# Patient Record
Sex: FEMALE | Race: WHITE | NOT HISPANIC OR LATINO | Employment: OTHER | URBAN - METROPOLITAN AREA
[De-identification: names, ages, dates, MRNs, and addresses within clinical notes are randomized per-mention and may not be internally consistent; named-entity substitution may affect disease eponyms.]

---

## 2019-02-18 ENCOUNTER — APPOINTMENT (OUTPATIENT)
Dept: GENERAL RADIOLOGY | Facility: CLINIC | Age: 69
End: 2019-02-18
Attending: PHYSICIAN ASSISTANT
Payer: COMMERCIAL

## 2019-02-18 ENCOUNTER — OFFICE VISIT (OUTPATIENT)
Dept: URGENT CARE | Facility: URGENT CARE | Age: 69
End: 2019-02-18
Payer: COMMERCIAL

## 2019-02-18 ENCOUNTER — HOSPITAL ENCOUNTER (EMERGENCY)
Facility: CLINIC | Age: 69
Discharge: HOME | End: 2019-02-18
Attending: PHYSICIAN ASSISTANT | Admitting: PHYSICIAN ASSISTANT
Payer: COMMERCIAL

## 2019-02-18 ENCOUNTER — APPOINTMENT (OUTPATIENT)
Dept: ULTRASOUND IMAGING | Facility: CLINIC | Age: 69
End: 2019-02-18
Attending: PHYSICIAN ASSISTANT
Payer: COMMERCIAL

## 2019-02-18 VITALS
OXYGEN SATURATION: 100 % | SYSTOLIC BLOOD PRESSURE: 138 MMHG | DIASTOLIC BLOOD PRESSURE: 73 MMHG | BODY MASS INDEX: 20.12 KG/M2 | HEART RATE: 56 BPM | WEIGHT: 110 LBS | RESPIRATION RATE: 16 BRPM | TEMPERATURE: 97.1 F

## 2019-02-18 VITALS
HEART RATE: 67 BPM | OXYGEN SATURATION: 94 % | RESPIRATION RATE: 20 BRPM | WEIGHT: 110 LBS | SYSTOLIC BLOOD PRESSURE: 98 MMHG | TEMPERATURE: 97.3 F | BODY MASS INDEX: 20.12 KG/M2 | DIASTOLIC BLOOD PRESSURE: 56 MMHG

## 2019-02-18 DIAGNOSIS — R10.11 RUQ ABDOMINAL PAIN: Primary | ICD-10-CM

## 2019-02-18 DIAGNOSIS — R10.811 RIGHT UPPER QUADRANT ABDOMINAL TENDERNESS WITHOUT REBOUND TENDERNESS: ICD-10-CM

## 2019-02-18 LAB
ALBUMIN SERPL-MCNC: 4.1 G/DL (ref 3.4–5)
ALP SERPL-CCNC: 57 U/L (ref 40–150)
ALT SERPL W P-5'-P-CCNC: 22 U/L (ref 0–50)
ANION GAP SERPL CALCULATED.3IONS-SCNC: 4 MMOL/L (ref 3–14)
AST SERPL W P-5'-P-CCNC: 16 U/L (ref 0–45)
BASOPHILS # BLD AUTO: 0.1 10E9/L (ref 0–0.2)
BASOPHILS NFR BLD AUTO: 0.5 %
BILIRUB SERPL-MCNC: 0.4 MG/DL (ref 0.2–1.3)
BUN SERPL-MCNC: 28 MG/DL (ref 7–30)
CALCIUM SERPL-MCNC: 8.9 MG/DL (ref 8.5–10.1)
CHLORIDE SERPL-SCNC: 107 MMOL/L (ref 94–109)
CO2 SERPL-SCNC: 28 MMOL/L (ref 20–32)
CREAT SERPL-MCNC: 0.89 MG/DL (ref 0.52–1.04)
D DIMER PPP FEU-MCNC: 0.3 UG/ML FEU (ref 0–0.5)
DIFFERENTIAL METHOD BLD: NORMAL
EOSINOPHIL # BLD AUTO: 0.2 10E9/L (ref 0–0.7)
EOSINOPHIL NFR BLD AUTO: 1.4 %
ERYTHROCYTE [DISTWIDTH] IN BLOOD BY AUTOMATED COUNT: 13 % (ref 10–15)
GFR SERPL CREATININE-BSD FRML MDRD: 65 ML/MIN/{1.73_M2}
GLUCOSE SERPL-MCNC: 84 MG/DL (ref 70–99)
HCT VFR BLD AUTO: 42.1 % (ref 35–47)
HGB BLD-MCNC: 13.7 G/DL (ref 11.7–15.7)
IMM GRANULOCYTES # BLD: 0 10E9/L (ref 0–0.4)
IMM GRANULOCYTES NFR BLD: 0.3 %
LIPASE SERPL-CCNC: 112 U/L (ref 73–393)
LYMPHOCYTES # BLD AUTO: 2 10E9/L (ref 0.8–5.3)
LYMPHOCYTES NFR BLD AUTO: 18.7 %
MCH RBC QN AUTO: 32.5 PG (ref 26.5–33)
MCHC RBC AUTO-ENTMCNC: 32.5 G/DL (ref 31.5–36.5)
MCV RBC AUTO: 100 FL (ref 78–100)
MONOCYTES # BLD AUTO: 0.6 10E9/L (ref 0–1.3)
MONOCYTES NFR BLD AUTO: 5.3 %
NEUTROPHILS # BLD AUTO: 7.9 10E9/L (ref 1.6–8.3)
NEUTROPHILS NFR BLD AUTO: 73.8 %
NRBC # BLD AUTO: 0 10*3/UL
NRBC BLD AUTO-RTO: 0 /100
PLATELET # BLD AUTO: 321 10E9/L (ref 150–450)
POTASSIUM SERPL-SCNC: 3.3 MMOL/L (ref 3.4–5.3)
PROT SERPL-MCNC: 7.7 G/DL (ref 6.8–8.8)
RBC # BLD AUTO: 4.21 10E12/L (ref 3.8–5.2)
SODIUM SERPL-SCNC: 139 MMOL/L (ref 133–144)
TROPONIN I SERPL-MCNC: <0.015 UG/L (ref 0–0.04)
WBC # BLD AUTO: 10.7 10E9/L (ref 4–11)

## 2019-02-18 PROCEDURE — 76705 ECHO EXAM OF ABDOMEN: CPT

## 2019-02-18 PROCEDURE — 85025 COMPLETE CBC W/AUTO DIFF WBC: CPT | Performed by: PHYSICIAN ASSISTANT

## 2019-02-18 PROCEDURE — 80053 COMPREHEN METABOLIC PANEL: CPT | Performed by: PHYSICIAN ASSISTANT

## 2019-02-18 PROCEDURE — 93005 ELECTROCARDIOGRAM TRACING: CPT

## 2019-02-18 PROCEDURE — 96374 THER/PROPH/DIAG INJ IV PUSH: CPT

## 2019-02-18 PROCEDURE — 85379 FIBRIN DEGRADATION QUANT: CPT | Performed by: PHYSICIAN ASSISTANT

## 2019-02-18 PROCEDURE — 36415 COLL VENOUS BLD VENIPUNCTURE: CPT | Performed by: PHYSICIAN ASSISTANT

## 2019-02-18 PROCEDURE — 99215 OFFICE O/P EST HI 40 MIN: CPT | Performed by: PHYSICIAN ASSISTANT

## 2019-02-18 PROCEDURE — 83690 ASSAY OF LIPASE: CPT | Performed by: PHYSICIAN ASSISTANT

## 2019-02-18 PROCEDURE — 84484 ASSAY OF TROPONIN QUANT: CPT | Performed by: PHYSICIAN ASSISTANT

## 2019-02-18 PROCEDURE — 25000128 H RX IP 250 OP 636: Performed by: PHYSICIAN ASSISTANT

## 2019-02-18 PROCEDURE — 71101 X-RAY EXAM UNILAT RIBS/CHEST: CPT | Mod: RT

## 2019-02-18 PROCEDURE — 99285 EMERGENCY DEPT VISIT HI MDM: CPT | Mod: 25

## 2019-02-18 RX ORDER — KETOROLAC TROMETHAMINE 15 MG/ML
15 INJECTION, SOLUTION INTRAMUSCULAR; INTRAVENOUS ONCE
Status: COMPLETED | OUTPATIENT
Start: 2019-02-18 | End: 2019-02-18

## 2019-02-18 RX ADMIN — KETOROLAC TROMETHAMINE 15 MG: 15 INJECTION, SOLUTION INTRAMUSCULAR; INTRAVENOUS at 14:47

## 2019-02-18 ASSESSMENT — ENCOUNTER SYMPTOMS
DIARRHEA: 0
FEVER: 0
DIAPHORESIS: 1
HEADACHES: 0
WEAKNESS: 0
SHORTNESS OF BREATH: 0
LIGHT-HEADEDNESS: 0
VOMITING: 0
NAUSEA: 0
COUGH: 0
BACK PAIN: 1
NUMBNESS: 0

## 2019-02-18 NOTE — ED PROVIDER NOTES
History     Chief Complaint:  Chest Pain    The history is provided by the patient.      Nohemi Sanchez is a 68 year old female, current daily smoker, with history of hyperlipidemia and anxiety who presents with chest pain. The patient states that about 3 hours ago, she was sitting when she had a sudden onset of sharp pains below her ribcage, worse on the right side, with radiation around her back. She states that the pain exacerbates with inspiration and that she can only takes shallow breaths. The patient states that she has felt sweaty and warm as well. The patient reports that this pain feels similar to her history of pleurisy. The patient presented first to urgent care in [place], and they sent her here to the ED. She denies nausea, emesis, diarrhea, or recorded fevers. She denies a personal or family history of blood clots, recent travel or surgery, or use of hormones. She states that she took 2 Excedrin 7 hours ago.     Allergies:  No known drug allergies      Medications:    Aspirin  Escitalopram oxalate   Simvastatin   Ativan  Ambien    Past Medical History:    Anxiety  Depressive disorder   Hyperlipidemia    Past Surgical History:    C section    Family History:    History reviewed. No pertinent family history.      Social History:  PCP: [facility]   Marital Status:    Smoking status: current every day smoker  Alcohol use: No      Review of Systems   Constitutional: Positive for diaphoresis. Negative for fever.   Respiratory: Negative for cough and shortness of breath.    Cardiovascular: Positive for chest pain.   Gastrointestinal: Negative for diarrhea, nausea and vomiting.   Musculoskeletal: Positive for back pain.   Neurological: Negative for weakness, light-headedness, numbness and headaches.   All other systems reviewed and are negative.      Physical Exam     Patient Vitals for the past 24 hrs:   BP Temp Temp src Pulse Resp SpO2 Weight   02/18/19 1515 125/62 -- -- 52 -- -- --    02/18/19 1445 117/64 -- -- 56 -- 99 % --   02/18/19 1430 114/56 -- -- 55 -- 100 % --   02/18/19 1415 116/62 -- -- 58 -- 100 % --   02/18/19 1400 109/58 -- -- 61 -- 100 % --   02/18/19 1320 127/59 97.1  F (36.2  C) Oral 70 20 98 % 49.9 kg (110 lb)        Physical Exam  General: Alert, interactive. GCS 15  Head:  Scalp is atraumatic.  Eyes:  EOM intact. The pupils are equal, round, and reactive to light. No scleral icterus.  ENT:                                      Ears:  The external ears are normal.  Nose:  The external nose is normal.  Throat:  The oropharynx is normal. Mucus membranes are moist.                 Neck:  Normal range of motion. There is no rigidity.   CV:  Regular rate and rhythm. No murmur. 2+ radial pulses.  No reproducible chest wall tenderness.  Resp:  Breath sounds are clear bilaterally. Non-labored, no retractions or accessory muscle use.  GI:  Abdomen is soft, no distension, RUQ tenderness to palpation. Remainder of abdomen non-tender.   MS:  Normal range of motion.   Skin:  Warm and dry.   Neuro:  Strength and sensation grossly intact.   Psych:  Awake. Alert.  Appropriate interactions.     Emergency Department Course   ECG (13:26:58):  Rate 57 bpm. MT interval 138. QRS duration 84. QT/QTc 444/432. P-R-T axes 45 51 50.   Sinus bradycardia  Otherwise normal ECG  Interpreted at 1502 by Theo Cortez MD and Marge Gates PA-C.     Imaging:  Radiographic findings were communicated with the patient who voiced understanding of the findings.    Ribs XR, unilat 3 views + PA chest, right  Impression: No acute abnormality.  As read by Radiology.     Abdomen US, limited (RUQ only)  Impression:   1.  Dilated common bile duct, 1.1 cm, nonurgent MRCP could be obtained  for further evaluation.  2.  Prominent gallbladder without wall thickening, pericholecystic  abnormality or intraluminal sludge/stones. Findings are nonspecific.  If high clinical concern for acute cholecystitis a HIDA scan could  be  obtained.  As read by Radiology.     Laboratory:  Lipase: 112    CBC: WNL (WBC 10.7, HGB 13.7, )     CMP: potassium 3.3, creatinine 0.89    Troponin I: <0.015    D dimer: 0.3    Interventions:  1447: Toradol 15 mg, IV      Emergency Department Course:  Past medical records, nursing notes, and vitals reviewed.  1413: I performed an exam of the patient and obtained history, as documented above.    IV inserted and blood drawn.   The patient was sent for a Xray and US while in the emergency department, findings above.     1550: I rechecked the patient. Explained findings to the patient.    I rechecked the patient. Findings and plan explained to the Patient. Patient discharged home with instructions regarding supportive care, medications, and reasons to return. The importance of close follow-up was reviewed.       Impression & Plan      Medical Decision Making:  Nohemi Sanchez is a 68 year old female presents emergency department with chief complaint of chest pain, she describes right lower quadrant intermittent sharp chest pain, suspicious for pleurisy.  On exam, she has more right upper quadrant tenderness raising concern for hepatobiliary etiology.  Vitals normal on arrival.  Given the pleuritic chest pain component, d-dimer ordered.  She falls into the low risk category and with a negative d-dimer and no indication for CT chest.  EKG shows not sinus bradycardia without any acute ischemic changes.  Troponin negative.  Doubt acute coronary syndrome.  Right upper quadrant ultrasound revealed a dilated common bile duct of 1.1 cm and a prominent gallbladder without evidence of wall thickening or sludge/stones.  Without leukocytosis, fever, and patient is in no acute distress, I feel the patient can follow-up closely as an outpatient.  I am not convinced patient from her gallbladder after the Toradol she had complete resolution of her symptoms and certainly a possibility is pleuisy.  I emphasized the  importance of following up with her primary care provider in 1-2 days for recheck or return to the emergency department at any time for worsening pain, fevers, or any other new/concerning symptoms.    Diagnosis:    ICD-10-CM    1. Right upper quadrant abdominal tenderness without rebound tenderness R10.811        Disposition:  discharged to home    Discharge Medications:     Medication List      There are no discharge medications for this visit.         I, Megan Beh, am serving as a scribe at 2:13 PM on 2/18/2019 to document services personally performed by Marge Gates PA-C based on my observations and the provider's statements to me.      Megan Beh  2/18/2019   [facility] EMERGENCY DEPARTMENT       Marge Gates PA-C  02/18/19 1933

## 2019-02-18 NOTE — DISCHARGE INSTRUCTIONS
*Get plenty of rest and avoid strenuous activities.  *Take medications as prescribed.  Ibuprofen/Tylenol for pain.    *Follow-up with your doctor for a recheck within 1-2 days.   *Return to the ER if you develop fever, worsening pain, faint or feel like you will faint or become worse in any way.     Discharge Instructions  Abdominal Pain    Abdominal pain (belly pain) can be caused by many things. Your evaluation today does not show the exact cause for your pain. Your provider today has decided that it is unlikely your pain is due to a life threatening problem, or a problem requiring surgery or hospital admission. Sometimes those problems cannot be found right away, so it is very important that you follow up as directed.  Sometimes only the changes which occur over time allow the cause of your pain to be found.    Generally, every Emergency Department visit should have a follow-up clinic visit with either a primary or a specialty clinic/provider. Please follow-up as instructed by your emergency provider today. With abdominal pain, we often recommend very close follow-up, such as the following day.    ADULTS:  Return to the Emergency Department right away if:    You get an oral temperature above 102oF or as directed by your provider.  You have blood in your stools. This may be bright red or appear as black, tarry stools.    You keep vomiting (throwing up) or cannot drink liquids.  You see blood when you vomit.   You cannot have a bowel movement or you cannot pass gas.  Your stomach gets bloated or bigger.  Your skin or the whites of your eyes look yellow.  You faint.  You have bloody, frequent or painful urination (peeing).  You have new symptoms or anything that worries you.    CHILDREN:  Return to the Emergency Department right away if your child has any of the above-listed symptoms or the following:    Pushes your hand away or screams/cries when his/her belly is touched.  You notice your child is very fussy or  weak.  Your child is very tired and is too tired to eat or drink.  Your child is dehydrated.  Signs of dehydration can be:  Significant change in the amount of wet diapers/urine.  Your infant or child starts to have dry mouth and lips, or no saliva (spit) or tears.    PREGNANT WOMEN:  Return to the Emergency Department right away if you have any of the above-listed symptoms or the following:    You have bleeding, leaking fluid or passing tissue from the vagina.  You have worse pain or cramping, or pain in your shoulder or back.  You have vomiting that will not stop.  You have a temperature of 100oF or more.  Your baby is not moving as much as usual.  You faint.  You get a bad headache with or without eye problems and abdominal pain.  You have a seizure.  You have unusual discharge from your vagina and abdominal pain.    Abdominal pain is pretty common during pregnancy.  Your pain may or may not be related to your pregnancy. You should follow-up closely with your OB provider so they can evaluate you and your baby.  Until you follow-up with your regular provider, do the following:     Avoid sex and do not put anything in your vagina.  Drink clear fluids.  Only take medications approved by your provider.    MORE INFORMATION:    Appendicitis:  A possible cause of abdominal pain in any person who still has their appendix is acute appendicitis. Appendicitis is often hard to diagnose.  Testing does not always rule out early appendicitis or other causes of abdominal pain. Close follow-up with your provider and re-evaluations may be needed to figure out the reason for your abdominal pain.    Follow-up:  It is very important that you make an appointment with your clinic and go to the appointment.  If you do not follow-up with your primary provider, it may result in missing an important development which could result in permanent injury or disability and/or lasting pain.  If there is any problem keeping your appointment, call  "your provider or return to the Emergency Department.    Medications:  Take your medications as directed by your provider today.  Before using over-the-counter medications, ask your provider and make sure to take the medications as directed.  If you have any questions about medications, ask your provider.    Diet:  Resume your normal diet as much as possible, but do not eat fried, fatty or spicy foods while you have pain.  Do not drink alcohol or have caffeine.  Do not smoke tobacco.    Probiotics: If you have been given an antibiotic, you may want to also take a probiotic pill or eat yogurt with live cultures. Probiotics have \"good bacteria\" to help your intestines stay healthy. Studies have shown that probiotics help prevent diarrhea (loose stools) and other intestine problems (including C. diff infection) when you take antibiotics. You can buy these without a prescription in the pharmacy section of the store.     If you were given a prescription for medicine here today, be sure to read all of the information (including the package insert) that comes with your prescription.  This will include important information about the medicine, its side effects, and any warnings that you need to know about.  The pharmacist who fills the prescription can provide more information and answer questions you may have about the medicine.  If you have questions or concerns that the pharmacist cannot address, please call or return to the Emergency Department.       Remember that you can always come back to the Emergency Department if you are not able to see your regular provider in the amount of time listed above, if you get any new symptoms, or if there is anything that worries you.   "

## 2019-02-18 NOTE — ED TRIAGE NOTES
Right sided chest pain x 3 hours. Patient states it hurts to breath. ABC intact alert and no distress.

## 2019-02-18 NOTE — PROGRESS NOTES
CHIEF COMPLAINT:   Chief Complaint   Patient presents with     Urgent Care     Chest Pain     sharp pain under rib X2 hr       HPI: Nohemi Sanchez is a 68 year old female who presents to clinic today for evaluation of pain on the right side of her rib cage. Patient reports that the symptoms began 2 hours ago and have been constant and worsening. She notes that taking a deep breath in causes the pain to worsen. Pain radiates to the back. She has had pleurisy in the past, and pain feels similar to that pain. Denies trauma to her chest. She denies having a fever, chills, body aches, sob or hemoptysis.     Past Medical History:   Diagnosis Date     Anxiety      Depressive disorder      Past Surgical History:   Procedure Laterality Date     GYN SURGERY       Social History     Tobacco Use     Smoking status: Current Every Day Smoker   Substance Use Topics     Alcohol use: No     Current Outpatient Medications   Medication     ASPIRIN PO     ESCITALOPRAM OXALATE PO     LORAZEPAM PO     Multiple Vitamin (MULTIVITAMINS PO)     NEFAZODONE HCL PO     SIMVASTATIN PO     ZOLPIDEM TARTRATE PO     No current facility-administered medications for this visit.      No Known Allergies    10 point ROS of systems including Constitutional, Eyes, Respiratory, Cardiovascular, Gastroenterology, Genitourinary, Integumentary, Muscularskeletal, Psychiatric were all negative except for pertinent positives noted in my HPI.        Exam:  BP 93/54 (BP Location: Right arm, Patient Position: Left side, Cuff Size: Adult Small)   Pulse 67   Temp 97.3  F (36.3  C)   Resp 20   Wt 49.9 kg (110 lb)   SpO2 94%   BMI 20.12 kg/m    Constitutional: healthy, alert and no distress  Head: Normocephalic, atraumatic.  Eyes: conjunctiva clear, no drainage  ENT:MMM  Neck: neck is supple, no cervical lymphadenopathy or nuchal rigidity  Cardiovascular: RRR  Respiratory: CTA bilaterally, no rhonchi or rales  Gastrointestinal: Tenderness in RUQ. NO rebound or  rigidity.   Skin: no rashes  Neurologic: Speech clear, gait normal. Moves all extremities.      ASSESSMENT/PLAN:  1. RUQ abdominal pain  Ddx: Pleurisy, MI, PE, Cholecystitis, Cholelithiasis, rib fracture  68 year old female presents to the clinic for evaluation of chest wall pain. Patient reports that the pain is made worse with taking a deep breath in, but is present without breathing. On exam, she does have TTP on right upper quadrant. I wanted to do EKG on patient, at which she declined. I offered transport by EMS, for which she also declines. I discussed with patient limitations of UC and the need to follow-up in ED for further evaluation and treatment. Patient will head to the ED now.     Tala Ferrell PA-C

## 2019-02-18 NOTE — ED AVS SNAPSHOT
[facility] Emergency Department  [address]  [facility] [address]  Phone:  [phone]  Fax:  [phone]                                    Nohemi Sanchez   MRN: [id number]    Department:  [facility] Emergency Department   Date of Visit:  2/18/2019           After Visit Summary Signature Page    I have received my discharge instructions, and my questions have been answered. I have discussed any challenges I see with this plan with the nurse or doctor.    ..........................................................................................................................................  Patient/Patient Representative Signature      ..........................................................................................................................................  Patient Representative Print Name and Relationship to Patient    ..................................................               ................................................  Date                                   Time    ..........................................................................................................................................  Reviewed by Signature/Title    ...................................................              ..............................................  Date                                               Time          22EPIC Rev 08/18

## 2019-02-19 LAB — INTERPRETATION ECG - MUSE: NORMAL

## 2019-03-15 ENCOUNTER — OFFICE VISIT (OUTPATIENT)
Dept: URGENT CARE | Facility: URGENT CARE | Age: 69
End: 2019-03-15
Payer: COMMERCIAL

## 2019-03-15 VITALS
TEMPERATURE: 98.6 F | WEIGHT: 115 LBS | OXYGEN SATURATION: 95 % | BODY MASS INDEX: 21.03 KG/M2 | SYSTOLIC BLOOD PRESSURE: 110 MMHG | DIASTOLIC BLOOD PRESSURE: 80 MMHG | HEART RATE: 69 BPM

## 2019-03-15 DIAGNOSIS — R30.0 DYSURIA: Primary | ICD-10-CM

## 2019-03-15 LAB
ALBUMIN UR-MCNC: NEGATIVE MG/DL
APPEARANCE UR: CLEAR
BILIRUB UR QL STRIP: NEGATIVE
COLOR UR AUTO: YELLOW
GLUCOSE UR STRIP-MCNC: NEGATIVE MG/DL
HGB UR QL STRIP: ABNORMAL
KETONES UR STRIP-MCNC: 40 MG/DL
LEUKOCYTE ESTERASE UR QL STRIP: NEGATIVE
NITRATE UR QL: NEGATIVE
NON-SQ EPI CELLS #/AREA URNS LPF: ABNORMAL /LPF
PH UR STRIP: 5.5 PH (ref 5–7)
RBC #/AREA URNS AUTO: ABNORMAL /HPF
SOURCE: ABNORMAL
SP GR UR STRIP: 1.02 (ref 1–1.03)
UROBILINOGEN UR STRIP-ACNC: 0.2 EU/DL (ref 0.2–1)
WBC #/AREA URNS AUTO: ABNORMAL /HPF

## 2019-03-15 PROCEDURE — 81001 URINALYSIS AUTO W/SCOPE: CPT | Performed by: FAMILY MEDICINE

## 2019-03-15 PROCEDURE — 99213 OFFICE O/P EST LOW 20 MIN: CPT | Performed by: FAMILY MEDICINE

## 2019-03-16 NOTE — PATIENT INSTRUCTIONS
Patient Education     Understanding Kidney Stones  Your kidneys are bean-shaped organs. They help filter extra salts, waste, and water from your body. You need to drink enough water every day to help flush the extra salts into your urine.     What are kidney stones?  Kidney stones are made up of chemical crystals that separate out from urine. These crystals clump together to make stones. They form in the calyx of the kidney. They may stay in the kidney or move into the urinary tract.   Why kidney stones form  Kidneys form stones for many reasons. If you don t drink enough water, for instance, you won t have enough urine to dilute chemicals. Then the chemicals may form crystals, which can develop into stones. Here are some reasons why kidney stones form:    Fluid loss (dehydration). This can concentrate urine, causing stones to form.    Certain foods. Some foods contain large amounts of the chemicals that sometimes crystallize into stones. Eating foods that contain a lot of meat or salt can lead to more kidney stones.    Kidney infections. These infections foster stones by slowing urine flow or changing the acid balance of your urine.    Family history. If family members have had kidney stones, you re more likely to have them, too.    A lack of certain substances in your urine. Some substances can help protect you from forming stones. If you don t have enough of these in your urine, stone formation can increase.  Where stones form  Stones begin in the cup-shaped part of the kidney (calyx). Some stay in the calyx and grow. Others move into the kidney, pelvis, or into the ureter. There they can lodge, block the flow of urine, and cause pain.  Symptoms  Many stones cause sudden and severe pain and bloody urine. Others cause nausea or frequent, burning urination. Symptoms often depend on your stone s size and location. Fever may indicate a serious infection. Call your healthcare provider right away if you develop a  fever.  Date Last Reviewed: 1/1/2017 2000-2018 [employer]. [address] [place] [address], [place] [address]. All rights reserved. This information is not intended as a substitute for professional medical care. Always follow your healthcare professional's instructions.           Patient Education     Kidney Stone, Passed    A kidney stone (nephrolithiasis) starts as tiny crystals that form inside the kidney where urine is made. Most kidney stones enlarge to about 1/8 to 1/4 inch in size before leaving the kidney and moving toward the bladder. The sharp, cramping pain and nausea/vomiting you had was the stone moving through the ureter (the narrow tube joining the kidney to the bladder). Once the stone reaches your bladder, the pain stops. Pain may start again as the stone passes through the bladder and out through the urethra. There are 4 types of kidney stones. Eighty percent are calcium stones--mostly calcium oxalate but also some with calcium phosphate. The other 3 types include uric acid stones, struvite stones (from a preceding infection), and rarely, cystine stones.  Home care  The following guidelines will help you care for yourself at home:    Drink plenty of fluids. This increases urine flow and reduces the chance that a new stone will form. Healthy adults (no heart/liver/kidney disease) who have had a kidney stone should drink 12, 8-ounce glasses of fluids per day. Most of this should be water. The goal is to produce 1.5 to 2 quarts of almost colorless urine per 24 hours.    Unless another NSAID (non-steroidal anti-inflammatory drug) was given, you may take ibuprofen or naproxen in addition to any narcotic pain medicine your healthcare provider prescribes. If you have chronic liver or kidney disease or ever had a stomach ulcer or GI bleeding, talk with your healthcare provider before using these medicines.    Collecting the stone. If you were given a strainer, urinate into a jar then pour the  urine through the strainer and into the toilet. Keep doing this for 24 hours after your pain stops. Save any stone that you find in the strainer and bring it to your healthcare provider for analysis. If you do not collect a stone, a 24-hour urine specimen can be done at a later time by your healthcare provider to figure out the cause of your stone.  Prevention  Each year, there is a chance that a new stone will form (50% chance over the next 5 to 7 years). The risk is highest if you have a family history of kidney stones or have certain chronic illnesses such as hypertension, obesity, or diabetes. However, there are lifestyle and dietary changes that you can make to reduce the risk of getting another kidney stone.  Most kidney stones are made of calcium. The following advice can help you prevent calcium stones. If you don t know the type of stone you have, follow this advice until the healthcare provider determines the cause of your stone.  Things that help:    The most important thing you can do is to drink plenty of fluids each day, as described above.    Certain foods, such as wheat, rice, rye, barley, and beans, contain phytate. Phytate is a compound that may lower the risk of recurrence of any type of stone.    Eat more fruits and vegetables, especially those high in potassium.    Eat foods high in natural citrate like fruit and fruit juices (using low sugar).    Low calcium contributes to calcium type kidney stones. Eat a normal calcium diet and talk with your healthcare provider if you are taking calcium supplements. It may be detrimental to lower your calcium intake. New research shows that eating calcium-rich and oxalate-rich foods together lowers your risk of stones. This is because eating these foods together binds the minerals in the stomach and intestines before they can reach the kidneys.    Limit salt intake to 2 grams (1 teaspoon) per day. Use limited amounts when cooking, and don t add salt at the  table. Processed and canned foods are usually high in salt.    Spinach, rhubarb, peanuts, cashews, almonds, grapefruit, and grapefruit juice are all high oxalate foods and should be reduced, or eaten with calcium-rich foods. These foods include dairy, dark leafy greens, soy products, calcium-enriched foods, and others.    Reduce the amount of animal meat and high protein foods in your diet. This may lower your risk of uric acid stones.    Don't have excess sugar (sucrose) and fructose (sweetener in many soft drinks) in your diet.    If you take vitamin C as a supplement, don't take more than 1,000 milligrams (mg) per day.    A dietitian or your healthcare provider can give you ideas on how to change your diet to prevent more kidney stones.  Follow-up care  Follow up with your healthcare provider, or as advised. Even if you don't collect the kidney stone, it is possible to analyze a 24-hour urine collection for the cause of this stone. Discuss this with your healthcare provider.  If you had an X-ray, CT scan, or other diagnostic test, you will be told of any findings that may affect your care.  Call 911  Call 911 if you have any of these:    Weakness, dizziness, or fainting  When to seek medical advice  Call your healthcare provider right away if any of the these occur:    Severe pain that returns and not relieved by pain medicines    Repeated vomiting or unable to keep down fluids    Fever of 100.4 F (38 C) or higher, or as directed by your healthcare provider    Blood clots in urine    Foul smelling or cloudy urine    Unable to pass urine for 8 hours or increasing bladder pressure  Date Last Reviewed: 10/1/2016    2075-8731 [employer]. [address] [place] [address], [place] [address]. All rights reserved. This information is not intended as a substitute for professional medical care. Always follow your healthcare professional's instructions.

## 2019-03-18 NOTE — PROGRESS NOTES
SUBJECTIVE:  Nohemi Sanchez, a 68 year old female scheduled an appointment to discuss the following issues:  Dysuria    Medical, social, surgical, and family histories reviewed.    UTI (starting early AM. Urgency, abdominal pain, feels like uretha is raw. Abdominal pain is dull. )  Pt tells me she felt a BM urge (suprapubic dull pain) when she woke up at 4am today, then her urethra felt raw; now that has all resolved.  She drove herself here.  No hx of diabetes or kidney stones but this runs in her family. Vomited X1 this morning, no bile or blood.    ROS:  See HPI.   No fever/chills.  No chest pain/SOB.  No BM problems.  No dizziness or  syncope.      OBJECTIVE:  /80   Pulse 69   Temp 98.6  F (37  C) (Oral)   Wt 52.2 kg (115 lb)   SpO2 95%   BMI 21.03 kg/m    EXAM:  GENERAL APPEARANCE:  alert and no distress; no cyanosis or accessory muscle use; moist mucus membrane  EYES: Eyes grossly normal to inspection, PERRL and conjunctivae and sclerae normal  HENT: ear canals and TM's normal and nose and mouth without ulcers or lesions  NECK: no adenopathy, no asymmetry, masses, or scars and thyroid normal to palpation  RESP: lungs clear to auscultation - no rales, rhonchi or wheezes  CV: regular rates and rhythm, normal S1 S2, no S3 or S4 and no murmur, click or rub  LYMPHATICS: no cervical adenopathy  ABDOMEN: soft, nontender, without hepatosplenomegaly or masses and bowel sounds normal; no CVA tenderness  MS: extremities normal- no gross deformities noted  SKIN: no suspicious lesions or rashes  NEURO: Normal strength and tone, mentation intact and speech normal; oriented X3      ASSESSMENT/PLAN:  (R30.0) Dysuria  (primary encounter diagnosis)  Comment: urinalysis showed microscopic hematuria but no leukocytosis  Plan: *UA reflex to Microscopic and Culture ([place]         [facility] (except [place] and         [place]), Urine Microscopic    I advised that pt should present to ED for further  investigations but she refused and stated that she would go tomorrow morning instead.    Pt to f/up PCP if no improvement or worsening.  Warning signs and symptoms explained---be seen ASAP if worsening.

## 2019-08-23 ENCOUNTER — HOSPITAL ENCOUNTER (EMERGENCY)
Facility: CLINIC | Age: 69
Discharge: HOME | End: 2019-08-23
Attending: EMERGENCY MEDICINE | Admitting: EMERGENCY MEDICINE
Payer: COMMERCIAL

## 2019-08-23 VITALS
TEMPERATURE: 98 F | RESPIRATION RATE: 16 BRPM | HEART RATE: 62 BPM | OXYGEN SATURATION: 99 % | SYSTOLIC BLOOD PRESSURE: 148 MMHG | DIASTOLIC BLOOD PRESSURE: 74 MMHG

## 2019-08-23 DIAGNOSIS — R19.7 VOMITING AND DIARRHEA: ICD-10-CM

## 2019-08-23 DIAGNOSIS — R11.10 VOMITING AND DIARRHEA: ICD-10-CM

## 2019-08-23 LAB
ANION GAP SERPL CALCULATED.3IONS-SCNC: 7 MMOL/L (ref 3–14)
BASOPHILS # BLD AUTO: 0.1 10E9/L (ref 0–0.2)
BASOPHILS NFR BLD AUTO: 0.7 %
BUN SERPL-MCNC: 24 MG/DL (ref 7–30)
CALCIUM SERPL-MCNC: 8.6 MG/DL (ref 8.5–10.1)
CHLORIDE SERPL-SCNC: 110 MMOL/L (ref 94–109)
CO2 SERPL-SCNC: 26 MMOL/L (ref 20–32)
CREAT SERPL-MCNC: 0.62 MG/DL (ref 0.52–1.04)
DIFFERENTIAL METHOD BLD: ABNORMAL
EOSINOPHIL # BLD AUTO: 0 10E9/L (ref 0–0.7)
EOSINOPHIL NFR BLD AUTO: 0.3 %
ERYTHROCYTE [DISTWIDTH] IN BLOOD BY AUTOMATED COUNT: 13.4 % (ref 10–15)
GFR SERPL CREATININE-BSD FRML MDRD: >90 ML/MIN/{1.73_M2}
GLUCOSE SERPL-MCNC: 98 MG/DL (ref 70–99)
HCT VFR BLD AUTO: 36.4 % (ref 35–47)
HGB BLD-MCNC: 11.9 G/DL (ref 11.7–15.7)
IMM GRANULOCYTES # BLD: 0 10E9/L (ref 0–0.4)
IMM GRANULOCYTES NFR BLD: 0.2 %
INTERPRETATION ECG - MUSE: NORMAL
LYMPHOCYTES # BLD AUTO: 1.4 10E9/L (ref 0.8–5.3)
LYMPHOCYTES NFR BLD AUTO: 15.9 %
MAGNESIUM SERPL-MCNC: 2 MG/DL (ref 1.6–2.3)
MCH RBC QN AUTO: 32.5 PG (ref 26.5–33)
MCHC RBC AUTO-ENTMCNC: 32.7 G/DL (ref 31.5–36.5)
MCV RBC AUTO: 100 FL (ref 78–100)
MONOCYTES # BLD AUTO: 0.5 10E9/L (ref 0–1.3)
MONOCYTES NFR BLD AUTO: 5.1 %
NEUTROPHILS # BLD AUTO: 6.9 10E9/L (ref 1.6–8.3)
NEUTROPHILS NFR BLD AUTO: 77.8 %
NRBC # BLD AUTO: 0 10*3/UL
NRBC BLD AUTO-RTO: 0 /100
PLATELET # BLD AUTO: 318 10E9/L (ref 150–450)
POTASSIUM SERPL-SCNC: 3.4 MMOL/L (ref 3.4–5.3)
RBC # BLD AUTO: 3.66 10E12/L (ref 3.8–5.2)
SODIUM SERPL-SCNC: 143 MMOL/L (ref 133–144)
TROPONIN I SERPL-MCNC: <0.015 UG/L (ref 0–0.04)
WBC # BLD AUTO: 8.9 10E9/L (ref 4–11)

## 2019-08-23 PROCEDURE — 93005 ELECTROCARDIOGRAM TRACING: CPT

## 2019-08-23 PROCEDURE — 99284 EMERGENCY DEPT VISIT MOD MDM: CPT | Mod: 25

## 2019-08-23 PROCEDURE — 84484 ASSAY OF TROPONIN QUANT: CPT | Performed by: EMERGENCY MEDICINE

## 2019-08-23 PROCEDURE — 80048 BASIC METABOLIC PNL TOTAL CA: CPT | Performed by: EMERGENCY MEDICINE

## 2019-08-23 PROCEDURE — 96374 THER/PROPH/DIAG INJ IV PUSH: CPT

## 2019-08-23 PROCEDURE — 96361 HYDRATE IV INFUSION ADD-ON: CPT

## 2019-08-23 PROCEDURE — 25000128 H RX IP 250 OP 636: Performed by: EMERGENCY MEDICINE

## 2019-08-23 PROCEDURE — 85025 COMPLETE CBC W/AUTO DIFF WBC: CPT | Performed by: EMERGENCY MEDICINE

## 2019-08-23 PROCEDURE — 83735 ASSAY OF MAGNESIUM: CPT | Performed by: EMERGENCY MEDICINE

## 2019-08-23 RX ORDER — ONDANSETRON 4 MG/1
4 TABLET, ORALLY DISINTEGRATING ORAL EVERY 6 HOURS PRN
Qty: 10 TABLET | Refills: 0 | Status: SHIPPED | OUTPATIENT
Start: 2019-08-23 | End: 2019-08-30

## 2019-08-23 RX ORDER — ONDANSETRON 2 MG/ML
4 INJECTION INTRAMUSCULAR; INTRAVENOUS ONCE
Status: COMPLETED | OUTPATIENT
Start: 2019-08-23 | End: 2019-08-23

## 2019-08-23 RX ADMIN — SODIUM CHLORIDE 1000 ML: 9 INJECTION, SOLUTION INTRAVENOUS at 10:26

## 2019-08-23 RX ADMIN — ONDANSETRON 4 MG: 2 INJECTION INTRAMUSCULAR; INTRAVENOUS at 10:26

## 2019-08-23 ASSESSMENT — ENCOUNTER SYMPTOMS
WEAKNESS: 1
BLOOD IN STOOL: 0
LIGHT-HEADEDNESS: 1
DYSURIA: 0
VOMITING: 1
DIARRHEA: 1
NAUSEA: 1

## 2019-08-23 NOTE — ED NOTES
Bed: ED10  Expected date: 8/23/19  Expected time:   Means of arrival: Ambulance  Comments:  Yessy Jaime2

## 2019-08-23 NOTE — ED PROVIDER NOTES
History     Chief Complaint:    Nausea, Vomiting, & Diarrhea      HPI   Nohemi Sanchez is a 69 year old female who presents to the ED via EMS for evaluation of nausea, vomiting, and diarrhea. The patient states that she started to have diarrhea three days ago. Two days, she states that she also developed nausea and vomiting and reports that she has been unable to keep anything down since then. She reports feeling generally weak and also light headed when she sits forward. She also notes that she had a two ache on the lower left-side for about a week and half and had an abscess drained by her dentist yesterday; she was not started on antibiotics following this. She otherwise denies any blood in her stool or vomit, dysuria, leg swelling, or recent medication changes.     Allergies:  The patient has no known drug allergies.    Medications:    Excedrin  Escitalopram  Ativan  Nefazodone  Simvastatin  Zolpidem     Past Medical History:    Anxiety  Depression  HLD  Osteopenia    Past Surgical History:    C/S  Humerus fracture surgery  Tonsillectomy    Family History:    Father: Alzheimer's disease  Mother: dementia, macular degeneration    Social History:  Current every day smoker; 1 PPD.   Negative for alcohol use.  Marital Status:       Review of Systems   Cardiovascular: Negative for leg swelling.   Gastrointestinal: Positive for diarrhea, nausea and vomiting. Negative for blood in stool.   Genitourinary: Negative for dysuria.   Neurological: Positive for weakness and light-headedness.   All other systems reviewed and are negative.    Physical Exam   First Vitals:  BP: (!) 172/79  Pulse: 55  Heart Rate: 56  Temp: 98  F (36.7  C)  Resp: 16  SpO2: 97 %    Patient Vitals for the past 24 hrs:   BP Temp Temp src Pulse Heart Rate Resp SpO2   08/23/19 1130 (!) 148/74 -- -- 62 -- -- 99 %   08/23/19 1100 138/61 -- -- 70 -- -- 92 %   08/23/19 1030 (!) 165/77 -- -- 55 -- -- 97 %   08/23/19 1000 (!) 167/72 -- -- 55 -- --  93 %   08/23/19 0947 (!) 172/79 98  F (36.7  C) Oral -- 56 16 97 %         Physical Exam  Constitutional: Vital signs reviewed as above.   HENT:               Head: No external signs of trauma noted.              Eyes: Conjunctivae are normal. Pupils are equal, round, and reactive to light.   Cardiovascular:               Normal rate, regular rhythm and normal heart sounds.                Exam reveals no friction rub.                No murmur heard.  Pulmonary/Chest:               Effort normal and breath sounds normal.               No respiratory distress.               There are no wheezes.               There are no rales.   Gastrointestinal:               Soft.               Bowel sounds normal.               There is no distension.               There is no tenderness on my exam.               There is no rebound or guarding.   Musculoskeletal:               Normal range of motion.               Normal Tone              No pitting edema noted.  Neurological: Patient is alert and oriented to person, place, and time.   Skin: Skin is warm and dry. Patient is not diaphoretic  Psychiatric: The patient appears calm      Emergency Department Course   ECG:  Indication: ST depression on EMS EKG  Time: 1027  Vent. Rate 54 bpm. FL interval 126. QRS duration 82. QT/QTc 480/455. P-R-T axis 46 60 51.  Sinus bradycardia. Otherwise normal ECG. No significant change compared to EKG dated 2/8/2019. Read time: 1040    Laboratory:  CBC: WBC: 8.9, HGB: 11.9, PLT: 318  BMP: Chloride: 110 (H), o/w WNL (Creatinine: 0.62)    Magnesium: 2.0  0957 Troponin: <0.015    Interventions:  1026 NS 1,000 mLs IV   Zofran 4 mg IV    Emergency Department Course:  Nursing notes and vitals reviewed. (1010) I performed an exam of the patient as documented above.     IV inserted. Medicine administered as documented above. Blood drawn. This was sent to the lab for further testing, results above.     1027 EKG obtained in the ED, see results above.      1130 Patient tolerating PO.    1206 I rechecked the patient and discussed the results of her workup thus far.     Findings and plan explained to the Patient. Patient discharged home with instructions regarding supportive care, medications, and reasons to return. The importance of close follow-up was reviewed. The patient was prescribed Zofran.    I personally reviewed the laboratory results with the Patient and answered all related questions prior to discharge.   Impression & Plan    Medical Decision Making:  This 69-year-old female patient presents the ED due to nausea, vomiting, and diarrhea.  Please see the HPI and exam for specifics.  The patient is remained stable in the ED.  She felt slightly better and was able to tolerate an oral challenge after Zofran.  Her laboratory studies are normal.  EKG was obtained as there was some question of the morphologies of the T waves on the EMS EKG though her EKG in the ED appears to show normal sinus rhythm.  The patient denies chest pain.  On reevaluation the patient states she wants to be discharged as she has a sick dog at home.  She notes that the dog also has diarrhea but is not vomiting.  I will encourage her to return to the ED if she feels worse. Anticipatory guidance given prior to discharge.      Diagnosis:    ICD-10-CM    1. Vomiting and diarrhea R11.10     R19.7        Disposition:  discharged to home    Discharge Medications:  Discharge Medication List as of 8/23/2019 12:20 PM      START taking these medications    Details   ondansetron (ZOFRAN ODT) 4 MG ODT tab Take 1 tablet (4 mg) by mouth every 6 hours as needed for nausea, Disp-10 tablet, R-0, Local Print           Scribe Disclosure:  I,  Aleks Valdivia, am serving as a scribe on 8/23/2019 at 10:10 AM to personally document services performed by Kevin Vogt DO based on my observations and the provider's statements to me.        Aleks Valdivia  8/23/2019   [facility] EMERGENCY  DEPARTMENT       Vogt, Kevin Laureano,   08/23/19 1543

## 2019-08-23 NOTE — ED TRIAGE NOTES
"PT arrived via EMS, pt stated, \" a week and a half ago I had a toothache and went to the dentist yesterday to have this abscess cleaned up.\" Pt states, \"have had N/V since Tuesday that has been constant, and unable to keep anything down.\" Pt denies any pain but feels weak. Pt is oriented x4 and  ABC's intact.  "

## 2019-08-23 NOTE — ED NOTES
Patient discharged to home. Patient received follow-up information with PCP if needed and medication instructions for Zofran. Patient received discharge instructions and has no other questions at this time.

## 2019-08-23 NOTE — ED AVS SNAPSHOT
[facility] Emergency Department  [address]  [facility] [address]  Phone:  [phone]  Fax:  [phone]                                    Nohemi Sanchez   MRN: [id number]    Department:  [facility] Emergency Department   Date of Visit:  8/23/2019           After Visit Summary Signature Page    I have received my discharge instructions, and my questions have been answered. I have discussed any challenges I see with this plan with the nurse or doctor.    ..........................................................................................................................................  Patient/Patient Representative Signature      ..........................................................................................................................................  Patient Representative Print Name and Relationship to Patient    ..................................................               ................................................  Date                                   Time    ..........................................................................................................................................  Reviewed by Signature/Title    ...................................................              ..............................................  Date                                               Time          22EPIC Rev 08/18

## 2023-08-24 ENCOUNTER — HOSPITAL ENCOUNTER (EMERGENCY)
Facility: CLINIC | Age: 73
Discharge: HOME | End: 2023-08-24
Attending: EMERGENCY MEDICINE | Admitting: EMERGENCY MEDICINE
Payer: COMMERCIAL

## 2023-08-24 ENCOUNTER — APPOINTMENT (OUTPATIENT)
Dept: GENERAL RADIOLOGY | Facility: CLINIC | Age: 73
End: 2023-08-24
Attending: EMERGENCY MEDICINE
Payer: COMMERCIAL

## 2023-08-24 VITALS
DIASTOLIC BLOOD PRESSURE: 67 MMHG | RESPIRATION RATE: 22 BRPM | OXYGEN SATURATION: 96 % | HEART RATE: 71 BPM | SYSTOLIC BLOOD PRESSURE: 108 MMHG | TEMPERATURE: 98.1 F

## 2023-08-24 DIAGNOSIS — R06.02 SHORTNESS OF BREATH: ICD-10-CM

## 2023-08-24 DIAGNOSIS — F41.9 ANXIETY: ICD-10-CM

## 2023-08-24 PROBLEM — F41.1 GAD (GENERALIZED ANXIETY DISORDER): Status: ACTIVE | Noted: 2023-08-24

## 2023-08-24 PROBLEM — F33.0 MAJOR DEPRESSIVE DISORDER, RECURRENT EPISODE, MILD (H): Status: ACTIVE | Noted: 2023-08-24

## 2023-08-24 LAB
ANION GAP SERPL CALCULATED.3IONS-SCNC: 12 MMOL/L (ref 7–15)
BASOPHILS # BLD AUTO: 0.1 10E3/UL (ref 0–0.2)
BASOPHILS NFR BLD AUTO: 1 %
BUN SERPL-MCNC: 33.4 MG/DL (ref 8–23)
CALCIUM SERPL-MCNC: 9.4 MG/DL (ref 8.8–10.2)
CHLORIDE SERPL-SCNC: 110 MMOL/L (ref 98–107)
CREAT SERPL-MCNC: 1.17 MG/DL (ref 0.51–0.95)
D DIMER PPP FEU-MCNC: <0.27 UG/ML FEU (ref 0–0.5)
DEPRECATED HCO3 PLAS-SCNC: 18 MMOL/L (ref 22–29)
EOSINOPHIL # BLD AUTO: 0.1 10E3/UL (ref 0–0.7)
EOSINOPHIL NFR BLD AUTO: 2 %
ERYTHROCYTE [DISTWIDTH] IN BLOOD BY AUTOMATED COUNT: 12.9 % (ref 10–15)
GFR SERPL CREATININE-BSD FRML MDRD: 48 ML/MIN/1.73M2
GLUCOSE SERPL-MCNC: 144 MG/DL (ref 70–99)
HCO3 BLDV-SCNC: 21 MMOL/L (ref 21–28)
HCT VFR BLD AUTO: 32.1 % (ref 35–47)
HGB BLD-MCNC: 11 G/DL (ref 11.7–15.7)
IMM GRANULOCYTES # BLD: 0 10E3/UL
IMM GRANULOCYTES NFR BLD: 0 %
LACTATE BLD-SCNC: 0.6 MMOL/L
LYMPHOCYTES # BLD AUTO: 2.3 10E3/UL (ref 0.8–5.3)
LYMPHOCYTES NFR BLD AUTO: 37 %
MCH RBC QN AUTO: 33.7 PG (ref 26.5–33)
MCHC RBC AUTO-ENTMCNC: 34.3 G/DL (ref 31.5–36.5)
MCV RBC AUTO: 99 FL (ref 78–100)
MONOCYTES # BLD AUTO: 0.4 10E3/UL (ref 0–1.3)
MONOCYTES NFR BLD AUTO: 6 %
NEUTROPHILS # BLD AUTO: 3.3 10E3/UL (ref 1.6–8.3)
NEUTROPHILS NFR BLD AUTO: 54 %
NRBC # BLD AUTO: 0 10E3/UL
NRBC BLD AUTO-RTO: 0 /100
NT-PROBNP SERPL-MCNC: 147 PG/ML (ref 0–900)
PCO2 BLDV: 36 MM HG (ref 40–50)
PH BLDV: 7.36 [PH] (ref 7.32–7.43)
PLATELET # BLD AUTO: 418 10E3/UL (ref 150–450)
PO2 BLDV: 37 MM HG (ref 25–47)
POTASSIUM SERPL-SCNC: 3.9 MMOL/L (ref 3.4–5.3)
RBC # BLD AUTO: 3.26 10E6/UL (ref 3.8–5.2)
SAO2 % BLDV: 69 % (ref 94–100)
SODIUM SERPL-SCNC: 140 MMOL/L (ref 136–145)
TROPONIN T SERPL HS-MCNC: 8 NG/L
WBC # BLD AUTO: 6.1 10E3/UL (ref 4–11)

## 2023-08-24 PROCEDURE — 36415 COLL VENOUS BLD VENIPUNCTURE: CPT | Performed by: EMERGENCY MEDICINE

## 2023-08-24 PROCEDURE — 82803 BLOOD GASES ANY COMBINATION: CPT

## 2023-08-24 PROCEDURE — 85025 COMPLETE CBC W/AUTO DIFF WBC: CPT | Performed by: EMERGENCY MEDICINE

## 2023-08-24 PROCEDURE — 90791 PSYCH DIAGNOSTIC EVALUATION: CPT

## 2023-08-24 PROCEDURE — 83880 ASSAY OF NATRIURETIC PEPTIDE: CPT | Performed by: EMERGENCY MEDICINE

## 2023-08-24 PROCEDURE — 250N000013 HC RX MED GY IP 250 OP 250 PS 637: Performed by: EMERGENCY MEDICINE

## 2023-08-24 PROCEDURE — 71046 X-RAY EXAM CHEST 2 VIEWS: CPT

## 2023-08-24 PROCEDURE — 83605 ASSAY OF LACTIC ACID: CPT

## 2023-08-24 PROCEDURE — 80048 BASIC METABOLIC PNL TOTAL CA: CPT | Performed by: EMERGENCY MEDICINE

## 2023-08-24 PROCEDURE — 99285 EMERGENCY DEPT VISIT HI MDM: CPT | Mod: 25

## 2023-08-24 PROCEDURE — 85379 FIBRIN DEGRADATION QUANT: CPT | Performed by: EMERGENCY MEDICINE

## 2023-08-24 PROCEDURE — 84484 ASSAY OF TROPONIN QUANT: CPT | Performed by: EMERGENCY MEDICINE

## 2023-08-24 PROCEDURE — 93005 ELECTROCARDIOGRAM TRACING: CPT

## 2023-08-24 RX ORDER — LORAZEPAM 0.5 MG/1
0.5 TABLET ORAL ONCE
Status: COMPLETED | OUTPATIENT
Start: 2023-08-24 | End: 2023-08-24

## 2023-08-24 RX ADMIN — LORAZEPAM 0.5 MG: 0.5 TABLET ORAL at 16:04

## 2023-08-24 ASSESSMENT — ACTIVITIES OF DAILY LIVING (ADL)
ADLS_ACUITY_SCORE: 35
ADLS_ACUITY_SCORE: 35

## 2023-08-24 NOTE — ED NOTES
Bed: ED19  Expected date: 8/24/23  Expected time: 3:42 PM  Means of arrival: Ambulance  Comments:  [facility] 74 yo anxiety

## 2023-08-24 NOTE — ED PROVIDER NOTES
History     Chief Complaint:  Shortness of Breath and Anxiety       HPI   Nohemi Sanchez is a 73 year old female hypertension, anxiety and tobacco use disorder presenting for increased anxiousness, and 1 month of mild breathlessness associated mostly with difficulty sleeping.  She reports she is definitely having increased anxiousness, feels alone as her only family member lives in [facility] but denies suicidal homicidal ideation.  She does endorse intermittent chest heaviness but no overt pain, no change with exerting yourself, denies leg swelling, recent travel.      Independent Historian:   None - Patient Only    Review of External Notes:   Primary care visit from last month and [facility] she is managed by Dr. Colin [facility], at that time thought her anxiety was well controlled on her Lexapro and Ativan.  Is on Ambien.      Medications:    Lexapro  Ativan  Ambien  Statin  Lisinopril    Past Medical History:    Insomnia  Anxiety  Hypertension  Hyperlipidemia  Tobacco use disorder    Past Surgical History:    Past Surgical History:   Procedure Laterality Date    GYN SURGERY          Physical Exam   Patient Vitals for the past 24 hrs:   BP Pulse Resp SpO2   08/24/23 1553 119/69 74 22 98 %        Physical Exam  Constitutional: Alert, attentive, GCS 15, anxious  HENT:    Nose: Nose normal.    Mouth/Throat: Oropharynx is clear, mucous membranes are moist  Eyes: EOM are normal, anicteric, conjugate gaze  CV: regular rate and rhythm; no murmurs  Chest: Effort normal and breath sounds clear without wheezing or rales, symmetric bilaterally   GI:  non tender. No distension. No guarding or rebound.    MSK: No LE edema, no tenderness to palpation of BLE.  Neurological: Alert, attentive, moving all extremities equally.   Skin: Skin is warm and dry.  Psych: Anxious, denies homicidal or suicidal ideation      Emergency Department Course     ECG results from 08/24/23   EKG 12 lead     Value    Systolic Blood Pressure      Diastolic Blood Pressure     Ventricular Rate 70    Atrial Rate 70    LA Interval 130    QRS Duration 72        QTc 401    P Axis 30    R AXIS 55    T Axis 77    Interpretation ECG      Sinus rhythm  Normal ECG  When compared with ECG of 23-AUG-2019 10:27,  Nonspecific T wave abnormality no longer evident in Anterior leads  QT has shortened           Imaging:  Chest XR,  PA & LAT   Final Result   IMPRESSION: Calcified granuloma in the left lower lobe medially again noted. Lungs are otherwise clear. Heart and pulmonary vascularity are normal. No signs of acute disease. Prior right humeral IM rik placement.         Report per radiology    Laboratory:  Labs Ordered and Resulted from Time of ED Arrival to Time of ED Departure - No data to display       Emergency Department Course & Assessments:    Interventions:  Medications - No data to display     Assessments:  3:57 PM I evaluated the patient, will plan for medical screening then DEC assessment.    Independent Interpretation (X-rays, CTs, rhythm strip):  None    Consultations/Discussion of Management or Tests:  DEC -they felt patient was safe for discharge.       Social Determinants of Health affecting care:   Socially isolated from her family    Disposition:  The patient was discharged to home.     Impression & Plan    Medical Decision Makin-year-old woman past medical history seen for insomnia, anxiety, hypertension, hyperlipidemia and tobacco use disorder without diagnosis of COPD presenting for increased anxiousness over the last month, poor sleep as well as intermittent chest heaviness and shortness of breath.  She does think that her anxiety has gotten worse, it was previously well controlled on Lexapro and Ativan.  However, given her age and risk factors, more comprehensive evaluation was undertaken, this was notable for screening EKG that was negative, negative troponin, no evidence of heart failure by labs or imaging.  D-dimer was negative as  such cxr was obtained which was negative.  She has been medically cleared and was put in line to see our DEC .  They felt patient was safe for discharge home, they will have her follow-up with her outpatient therapist, she denied to them homicidal suicidal ideation is just feeling overwhelmed and bored.  Patient was discharged.  Return precautions reviewed.  I did recommend follow-up with her PCP for her mild shortness of breath and smoking cessation.      Diagnosis:    ICD-10-CM    1. Anxiety  F41.9       2. Shortness of breath  R06.02            Madhav Monterroso MD  [employer]  7:06 PM 08/24/23          Madhav Monterroso MD  08/24/23 1906

## 2023-08-25 LAB
ATRIAL RATE - MUSE: 70 BPM
DIASTOLIC BLOOD PRESSURE - MUSE: NORMAL MMHG
INTERPRETATION ECG - MUSE: NORMAL
P AXIS - MUSE: 30 DEGREES
PR INTERVAL - MUSE: 130 MS
QRS DURATION - MUSE: 72 MS
QT - MUSE: 372 MS
QTC - MUSE: 401 MS
R AXIS - MUSE: 55 DEGREES
SYSTOLIC BLOOD PRESSURE - MUSE: NORMAL MMHG
T AXIS - MUSE: 77 DEGREES
VENTRICULAR RATE- MUSE: 70 BPM

## 2023-08-25 NOTE — DISCHARGE INSTRUCTIONS
Aftercare Plan  If I am feeling unsafe or I am in a crisis, I will: reach out to my son, my friends, neighbors, Muslim and [facility] crisis line for their support.  Contact my established care providers   Call the National Suicide Prevention Lifeline: 988  Go to the nearest emergency room   Call 911    Writer encourage Pt to take her medications as prescribed and keep all of scheduled appointments with her outpatient service providers.  Writer recommended Pt to follow up with her current outpatient psychiatrist for medication management.  Writer recommended Pt to engage in her new outpatient individual therapy service to address grief and loss issues, improve coping skills, but she declined.  DEC coordinator will contact Pt within next 1 or 2 business days to ensure coordination of care and provide assistance with appointments.     Warning signs that I or other people might notice when a crisis is developing for me: increased depression, panic attacks, anxiety, boredom, grief and loss issues, poor sleep and appetite.    Things I am able to do on my own to cope or help me feel better: reading, writing, watching TV, movies and listening to music, gardening, taking walks, spending time with my pets and animals, cleaning and organizing, kayaking, canoeing, crocheting, knitting, deep breathing exercise, meditation, affirmations, praying and studying Bible.     Things that I am able to do with others to cope or help me better: reaching out to my supportive son, neighbors, friends and Muslim.     Things I can use or do for distraction: reading, writing, watching TV, movies and listening to music, gardening, taking walks, spending time with my pets and animals, cleaning and organizing, kayaking, canoeing, crocheting, knitting, deep breathing exercise, meditation, affirmations, praying and studying Bible.      Changes I can make to support my mental health and wellness: Practicing good self-care skills, sleep routine, sleep  hygiene skills, healthy diet and regular exercise.  Joining a peer support group through [employer] [place] to increase positive support system.  [facility] ([employer] Mental Illness) improves the lives of children and adults with mental illnesses and their families by providing free classes on mental illnesses and support groups for adults with mental illnesses, parents and family members. For more information:  Phone: 567.896.5049  Toll free: 6-698-DHTM-HELPS  Website: www.namSputnikBot.orghttp://www.etaskr.org/      People in my life that I can ask for help: my son, neighbors, friends and Hindu .     Your [facility] has a mental health crisis team you can call 24/7: [facility]  [phone]    Other things that are important when I'm in crisis: support from my son, neighbors, friends and Hindu.  National Suicide Prevention Lifeline at 988  Crisis Text Line: Free help is available 24/7 by texting HOME to 720756 or texting AYUDA for help in [place].    Additional resources and information: Below is a list of FREE Mental Health Options in the [facility] Area:    [facility] ([facility])  Serves those in emotional crisis with 24-hour, seven-day-a-week crisis counseling, assessment, referral, and medication management.   Suicidal: [phone] Consultation: [phone]  [address] [place] [address] 24/7 Crisis Intervention Center     [facility]  [phone]  Serves those in need of free outpatient mental health care  Hours: Mon, Wed, Fri 1-3pm; Mon-Thurs 6:30-8:30pm    [facility]  [address] [place] [id number]  [phone]         Crisis Lines  Crisis Text Line  Text 304111  You will be connected with a trained live crisis counselor to provide support.    Por espanol, texto  MONSE a 140731 o texto a 442-AYUDAME en WhatsApp    [employer] (LGBTQ Youth Crisis Line)  0.083.789.5473  text START to [address] [place] [address]  "Resources  Fast Tracker  Linking people to mental health and substance use disorder resources  fasttrackermn.org     [place] Mental Health Warm Line  Peer to peer support  Monday thru Saturday, 12 pm to 10 pm  657.805.8624 or 5.381.306.1894  Text \"Support\" to 23032    National [place] on Mental Illness ([employer])  212.202.9308 or 1.888.GUILLAUEM.HELPS      Mental Health Apps  My3  https://Coiney.org/    VirtualHopeBox  https://Rebiotix/apps/virtual-hope-box/      Additional Information  Today you were seen by a licensed mental health professional through Triage and Transition services, [facility] ([facility])  for a crisis assessment in the Emergency Department at [facility].  It is recommended that you follow up with your established providers (psychiatrist, mental health therapist, and/or primary care doctor - as relevant) as soon as possible. Coordinators from [facility] will be calling you in the next 24-48 hours to ensure that you have the resources you need.  You can also contact [facility] coordinators directly at [phone]. You may have been scheduled for or offered an appointment with a mental health provider. [facility] maintains an extensive network of licensed behavioral health providers to connect patients with the services they need.  We do not charge providers a fee to participate in our referral network.  We match patients with providers based on a patient's specific needs, insurance coverage, and location.  Our first effort will be to refer you to a provider within your care system, and will utilize providers outside your care system as needed.         "

## 2023-12-26 ENCOUNTER — HOSPITAL ENCOUNTER (EMERGENCY)
Facility: CLINIC | Age: 73
Discharge: LEFT BEFORE DISCHARGE | End: 2023-12-27
Admitting: EMERGENCY MEDICINE
Payer: COMMERCIAL

## 2023-12-26 PROCEDURE — 99281 EMR DPT VST MAYX REQ PHY/QHP: CPT

## 2023-12-26 ASSESSMENT — ACTIVITIES OF DAILY LIVING (ADL): ADLS_ACUITY_SCORE: 35

## 2023-12-27 ASSESSMENT — ACTIVITIES OF DAILY LIVING (ADL): ADLS_ACUITY_SCORE: 35

## 2023-12-27 NOTE — ED TRIAGE NOTES
EMS arrival:    Patient lives alone.     Weak, falling.  Having diarrhea.    Compllaints of nausea for 6 months.  Zofran q 8 hours for past 6 months.  Diarrhea tonight.  Sister found  in the last year & having anxiety from this.  VSS.   Declined IV & fluids.

## 2023-12-27 NOTE — ED NOTES
Patient states does not want to be seen.  Was seen for anxiety before & wants to go home.  Is going to call for ride home

## 2024-04-01 NOTE — ED TRIAGE NOTES
Pt arrives via EMS w/ complaints of sob, decrease appetite, nausea. Per EMS pt has hx of anxiety & has been having to use her prn ativan at home more. Pt hx of smoking.          01-Apr-2024 11:48